# Patient Record
Sex: MALE | URBAN - METROPOLITAN AREA
[De-identification: names, ages, dates, MRNs, and addresses within clinical notes are randomized per-mention and may not be internally consistent; named-entity substitution may affect disease eponyms.]

---

## 2024-11-19 ENCOUNTER — APPOINTMENT (RX ONLY)
Dept: URBAN - METROPOLITAN AREA CLINIC 2 | Facility: CLINIC | Age: 74
Setting detail: DERMATOLOGY
End: 2024-11-19

## 2024-11-19 DIAGNOSIS — D22 MELANOCYTIC NEVI: ICD-10-CM

## 2024-11-19 DIAGNOSIS — D18.0 HEMANGIOMA: ICD-10-CM

## 2024-11-19 DIAGNOSIS — L81.5 LEUKODERMA, NOT ELSEWHERE CLASSIFIED: ICD-10-CM

## 2024-11-19 DIAGNOSIS — L82.1 OTHER SEBORRHEIC KERATOSIS: ICD-10-CM

## 2024-11-19 DIAGNOSIS — L81.4 OTHER MELANIN HYPERPIGMENTATION: ICD-10-CM

## 2024-11-19 DIAGNOSIS — Q82.5 CONGENITAL NON-NEOPLASTIC NEVUS: ICD-10-CM

## 2024-11-19 DIAGNOSIS — L259 CONTACT DERMATITIS AND OTHER ECZEMA, UNSPECIFIED CAUSE: ICD-10-CM

## 2024-11-19 DIAGNOSIS — Z71.89 OTHER SPECIFIED COUNSELING: ICD-10-CM

## 2024-11-19 DIAGNOSIS — B00.1 HERPESVIRAL VESICULAR DERMATITIS: ICD-10-CM

## 2024-11-19 DIAGNOSIS — L57.8 OTHER SKIN CHANGES DUE TO CHRONIC EXPOSURE TO NONIONIZING RADIATION: ICD-10-CM

## 2024-11-19 PROBLEM — D22.5 MELANOCYTIC NEVI OF TRUNK: Status: ACTIVE | Noted: 2024-11-19

## 2024-11-19 PROBLEM — D18.01 HEMANGIOMA OF SKIN AND SUBCUTANEOUS TISSUE: Status: ACTIVE | Noted: 2024-11-19

## 2024-11-19 PROBLEM — L23.9 ALLERGIC CONTACT DERMATITIS, UNSPECIFIED CAUSE: Status: ACTIVE | Noted: 2024-11-19

## 2024-11-19 PROCEDURE — ? PHOTO-DOCUMENTATION

## 2024-11-19 PROCEDURE — ? PRESCRIPTION

## 2024-11-19 PROCEDURE — ? OTHER

## 2024-11-19 PROCEDURE — 99203 OFFICE O/P NEW LOW 30 MIN: CPT

## 2024-11-19 PROCEDURE — ? COUNSELING

## 2024-11-19 RX ORDER — VALACYCLOVIR HYDROCHLORIDE 1 G/1
2 TABLET, FILM COATED ORAL Q12 HOURS
Qty: 8 | Refills: 2 | Status: CANCELLED
Stop reason: SDUPTHER

## 2024-11-19 RX ORDER — VALACYCLOVIR HYDROCHLORIDE 1 G/1
2 TABLET, FILM COATED ORAL Q12 HOURS
Qty: 8 | Refills: 2 | Status: ERX | COMMUNITY
Start: 2024-11-19

## 2024-11-19 RX ORDER — TRIAMCINOLONE ACETONIDE 1 MG/G
1 CREAM TOPICAL BID
Qty: 80 | Refills: 3 | Status: ERX | COMMUNITY
Start: 2024-11-19

## 2024-11-19 RX ADMIN — TRIAMCINOLONE ACETONIDE 1: 1 CREAM TOPICAL at 00:00

## 2024-11-19 RX ADMIN — VALACYCLOVIR HYDROCHLORIDE 2: 1 TABLET, FILM COATED ORAL at 00:00

## 2024-11-19 ASSESSMENT — LOCATION DETAILED DESCRIPTION DERM
LOCATION DETAILED: RIGHT CENTRAL ZYGOMA
LOCATION DETAILED: LEFT INFERIOR VERMILION LIP
LOCATION DETAILED: INFERIOR THORACIC SPINE
LOCATION DETAILED: LEFT MEDIAL POSTERIOR ANKLE
LOCATION DETAILED: LEFT AXILLARY VAULT
LOCATION DETAILED: SUPERIOR THORACIC SPINE
LOCATION DETAILED: RIGHT INFERIOR MEDIAL UPPER BACK
LOCATION DETAILED: STERNUM
LOCATION DETAILED: LEFT PROXIMAL DORSAL FOREARM
LOCATION DETAILED: EPIGASTRIC SKIN
LOCATION DETAILED: RIGHT AXILLARY VAULT
LOCATION DETAILED: LEFT SUPERIOR LATERAL MALAR CHEEK
LOCATION DETAILED: RIGHT MEDIAL TRAPEZIAL NECK
LOCATION DETAILED: RIGHT PROXIMAL DORSAL FOREARM

## 2024-11-19 ASSESSMENT — LOCATION SIMPLE DESCRIPTION DERM
LOCATION SIMPLE: RIGHT FOREARM
LOCATION SIMPLE: CHEST
LOCATION SIMPLE: LEFT POSTERIOR ANKLE
LOCATION SIMPLE: LEFT LIP
LOCATION SIMPLE: RIGHT ZYGOMA
LOCATION SIMPLE: UPPER BACK
LOCATION SIMPLE: RIGHT AXILLARY VAULT
LOCATION SIMPLE: LEFT FOREARM
LOCATION SIMPLE: POSTERIOR NECK
LOCATION SIMPLE: RIGHT UPPER BACK
LOCATION SIMPLE: ABDOMEN
LOCATION SIMPLE: LEFT AXILLARY VAULT
LOCATION SIMPLE: LEFT CHEEK

## 2024-11-19 ASSESSMENT — LOCATION ZONE DERM
LOCATION ZONE: TRUNK
LOCATION ZONE: FACE
LOCATION ZONE: AXILLAE
LOCATION ZONE: LIP
LOCATION ZONE: ARM
LOCATION ZONE: NECK
LOCATION ZONE: LEG

## 2024-11-19 NOTE — PROCEDURE: OTHER
Render Risk Assessment In Note?: no
Note Text (......Xxx Chief Complaint.): This diagnosis correlates with the
Other (Free Text): Happens once a year in late summer.
Detail Level: Detailed

## 2024-11-19 NOTE — PROCEDURE: COUNSELING
Patient Specific Counseling (Will Not Stick From Patient To Patient): Will refer for patch testing at [facility]
Detail Level: Zone
Sunscreen Recommendations: Discussed improvement of continual sun protection with zinc based sunscreen and photo protective clothing.\\nRecommended daily 30-50 SPF
Bleaching Agents Recommendations: Discussed that bleaching creams may be helpful but discoloration will recur with sun exposure
Laser Recommendations: Discussed laser treatments can be helpful to lighten. Treatment is considered cosmetic and not covered by insurance
Detail Level: Generalized
Sunscreen Recommendations: Discussed continual use of photo protection with sun screen and photo protective clothing
Sunscreen Recommendations: Zinc & titanium based daily sunscreen minimum of 30SPF, photo protective clothing
Detail Level: Simple
Sunscreen Recommendations: Broad spectrum zinc oxide 30-50 SPF applied through the day
Skin Checks Recommendations: Photo protective clothing, broad spectrum zinc oxide 50 SPF

## 2024-11-19 NOTE — HPI: SKIN LESIONS
Is This A New Presentation, Or A Follow-Up?: Growths
How Severe Is Your Skin Lesion?: moderate
Have Your Skin Lesions Been Treated?: not been treated
Additional History: Baseline first FBSE.

## 2024-11-20 RX ORDER — TRIAMCINOLONE ACETONIDE 1 MG/G
1 CREAM TOPICAL BID
Qty: 80 | Refills: 3 | Status: ERX

## 2024-11-20 RX ORDER — VALACYCLOVIR HYDROCHLORIDE 1 G/1
2 TABLET, FILM COATED ORAL Q12 HOURS
Qty: 8 | Refills: 2 | Status: ERX

## 2024-12-10 ENCOUNTER — APPOINTMENT (OUTPATIENT)
Age: 74
Setting detail: DERMATOLOGY
End: 2024-12-10

## 2024-12-10 DIAGNOSIS — L259 CONTACT DERMATITIS AND OTHER ECZEMA, UNSPECIFIED CAUSE: ICD-10-CM

## 2024-12-10 PROBLEM — L23.9 ALLERGIC CONTACT DERMATITIS, UNSPECIFIED CAUSE: Status: ACTIVE | Noted: 2024-12-10

## 2024-12-10 PROCEDURE — ? PHOTO-DOCUMENTATION

## 2024-12-10 PROCEDURE — ? PATCH TESTING

## 2024-12-10 PROCEDURE — 95044 PATCH/APPLICATION TESTS: CPT

## 2024-12-10 PROCEDURE — ? ORDER FOR PATCH TESTING

## 2024-12-10 NOTE — PROCEDURE: PATCH TESTING
Detail Level: None
Consent: Written consent obtained, risks reviewed including but not limited to rash, itching, allergic reaction, systemic rash, remote possiblity of anaphylaxis to allergen.
Number Of Patches Placed (May Be More Than What Is Billed, Based On Payer Rules) - Do Not Duplicate If Number Placed Is Number Billed: 88
Post-Care Instructions: I reviewed with the patient in detail post-care instructions. Patient should not sweat, pick at, or get the patches wet for 48 hours.

## 2024-12-10 NOTE — PROCEDURE: ORDER FOR PATCH TESTING
Detail Level: Simple
Location Patches Should Be Applied: Back
Patch Test Reading Schedule Override: First reading after 48 hours and second reading on day 7
Patch Test To Be Applied: Core ACDS Recommended Series
Counseling: I discussed the timing of the procedure and ensured the patient understands that this test requires multiple visits. While the patches are in place they should be kept dry which will limit bathing, swimming an exercise. I also explained that it is common for testing to be negative and this doesn't mean there isn't a allergic reaction occurring. During the testing itching is common.
Patch Test Reading Schedule: Override

## 2024-12-12 ENCOUNTER — APPOINTMENT (OUTPATIENT)
Age: 74
Setting detail: DERMATOLOGY
End: 2024-12-12

## 2024-12-12 DIAGNOSIS — L259 CONTACT DERMATITIS AND OTHER ECZEMA, UNSPECIFIED CAUSE: ICD-10-CM

## 2024-12-12 PROBLEM — L23.9 ALLERGIC CONTACT DERMATITIS, UNSPECIFIED CAUSE: Status: ACTIVE | Noted: 2024-12-12

## 2024-12-12 PROCEDURE — 99213 OFFICE O/P EST LOW 20 MIN: CPT

## 2024-12-12 PROCEDURE — ? TREATMENT REGIMEN

## 2024-12-12 PROCEDURE — ? COUNSELING

## 2024-12-12 PROCEDURE — ? CORE ACDS PATCH TEST READING

## 2024-12-12 PROCEDURE — ? PHOTO-DOCUMENTATION

## 2024-12-12 ASSESSMENT — PAIN INTENSITY VAS: HOW INTENSE IS YOUR PAIN 0 BEING NO PAIN, 10 BEING THE MOST SEVERE PAIN POSSIBLE?: NO PAIN

## 2024-12-12 ASSESSMENT — ITCH NUMERIC RATING SCALE: HOW SEVERE IS YOUR ITCHING?: 0

## 2024-12-12 NOTE — PROCEDURE: TREATMENT REGIMEN
Detail Level: Detailed
Plan: Skin marker provided to patient with instructions to remark lines that may have faded.

## 2024-12-12 NOTE — PROCEDURE: CORE ACDS PATCH TEST READING
Name Of Allergen 38: 38. iodopropynyl betaine
Name Of Allergen 70: 70. ethyl hexyl glycerol
Name Of Allergen 75: 75. phenoxyethanol
Name Of Allergen 34: 34. fragrance mix 2
Allergen 30 Reaction: no reaction
Name Of Allergen 72: 72. clobetasol-17-propionate
Name Of Allergen 48: 48. cinnamic aldehyde
Show Allergen Counseling In The Note?: No
Name Of Allergen 7: 7. gabrielony
Name Of Allergen 83: 83. jasminum
Name Of Allergen 71: 71. triamcinalone acetonide
Name Of Allergen 49: 49. dl alpha tocopherol (vitamin E)
Name Of Allergen 19: 19. lyral
Name Of Allergen 5: 5. DMDM hydantoin (formaldehyde
Name Of Allergen 31: 31. hydrocortisone-17-butyrate (corticosteroids)
Name Of Allergen 80: 80. benzyl alcohol
Name Of Allergen 51: 51. tea tree oil oxidized
Name Of Allergen 52: 52. chlorhexidine diclugonate
Name Of Allergen 66: 66. dimethylol dihydroxyethyleneurea
Name Of Allergen 69: 69. cetylstearyalcohol
Name Of Allergen 56: 56. tosylamide formaldehyde resin
Allergen 19 Reaction: 3+
Name Of Allergen 40: 40. cocamidopropyl betaine
Name Of Allergen 1: 1. Nickel sulfate Hexahydrate
Name Of Allergen 6: 6. Fragrance mix 1
Name Of Allergen 22: 22. mercapto mix A (rubber accelerators)
Name Of Allergen 3: 3. neomycin sulfate
Name Of Allergen 14: 14. bisphenol A epoxy resin
Name Of Allergen 23: 23. 2-bromo-2-nitropropane-1,3-diol (formaldehyde)
Name Of Allergen 39: 39. polymyxin B sulfate
Name Of Allergen 59: 59. pramoxine hydrochloride 2%
Name Of Allergen 43: 43. 2-hydroxyethyl-methacrylate
Name Of Allergen 76: 76. disperse orange-3
Name Of Allergen 16: 16 black rubber mix
Name Of Allergen 88: 88. malia
Allergen 53 Reaction: irritant reaction
Name Of Allergen 41: 41. mixed dialkyl thioureas
Name Of Allergen 53: 53. propolis
Name Of Allergen 86: 86. lauryl glycoside
Name Of Allergen 78: 78. butylhydroxytoluene
Name Of Allergen 2: 2. Amerchol L101
What Reading Time Point?: 48 hour
Name Of Allergen 36: 36. lidocaine-hci
Name Of Allergen 82: 82. disperse yellow
Name Of Allergen 32: 32. 2-mercaptobenzothiazole
Name Of Allergen 13: 13. petrolatum-tert- butylphenol formaldehyde resin
Name Of Allergen 60: 60. benzalkonium chloride
Name Of Allergen 46: 46. methyl methacrylate
Name Of Allergen 77: 77. benzoic acid
Name Of Allergen 21: 21. formaldehyde
Name Of Allergen 8: 8. paraben Mix B,
Name Of Allergen 64: 64. cananga odorata
Name Of Allergen 57: 57. sesquiterpenelactone mix
Name Of Allergen 74: 74. ethyl cyanoacrylate
Allergen 34 Reaction: 2+
Name Of Allergen 9: 9. methylisothiazolinone
Name Of Allergen 29: 29. imidazolidinyl urea (formaldehyde)
Name Of Allergen 84: 84. peppermint oil
Name Of Allergen 73: 73. amidoamine
Name Of Allergen 20: 20. petrolatum-phenylenediamine
Name Of Allergen 30: 30. budesonide( corticosteroids)
Name Of Allergen 55: 55. 2-hydroxy-4-methoxybenzophenone
Number Of Patches Read: 88
Name Of Allergen 35: 35. disperse blue mix 124/106
Name Of Allergen 15: 15. carba mix (rubber accelerators)
Name Of Allergen 33: 33. bacitracin
Name Of Allergen 63: 63. sorbic acid
Name Of Allergen 17: 17. methylchlorothiazolinone/methylisothiazolinone
Name Of Allergen 25: 25. diazolidinyl urea (formaldehyde)
Name Of Allergen 87: 87. 4-chloro-3-cresol
Name Of Allergen 12: 12. cobalt 2 chloride hexahydrate
Name Of Allergen 18: 18. quaternium 15 (formaldehyde)
Name Of Allergen 47: 47. lavandula angustifolia oil
Name Of Allergen 27: 27. tixocortol-21-pivalate (corticosteroids)
Name Of Allergen 65: 65. compositae mix
Name Of Allergen 85: 85. shellac
Name Of Allergen 54: 54. 4-chloro-3,5-xylenol
Name Of Allergen 28: 28. gold sodium thiosulfate
Name Of Allergen 58: 58. coconut diethanolamide
Name Of Allergen 37: 37. propylene glycol
Name Of Allergen 50: 50. ethyl acrylate
Name Of Allergen 45: 45. decyl glucoside
Name Of Allergen 26: 26. benzocaine
Name Of Allergen 24: 24. thiuram mix A (rubber accelerators)
Name Of Allergen 62: 62. sodium benzoate
Name Of Allergen 11: 11. ethylenediamine dihydrochloride
Show Negative Results In The Note?: Yes
Name Of Allergen 44: 44. oleamidopropyl dimethylamine
Name [facility]en 10[facility]
Name Of Allergen 68: 68. 1,3-diphenylguanidine
Name Of Allergen 4: 4. potassium dichromate
Name Of Allergen 42: 42. dimethylaminopropylamine
Detail Level: Zone
Name Of Allergen 67: 67. sorbitan sesquioleate
Name Of Allergen 81: 81. benzyl salicylate
Name Of Allergen 79: 79. 2-ethylhexyl-4-methoxycinnamate
Name Of Allergen 61: 61. benzophenone chloride

## 2024-12-17 ENCOUNTER — APPOINTMENT (OUTPATIENT)
Age: 74
Setting detail: DERMATOLOGY
End: 2024-12-17

## 2024-12-17 DIAGNOSIS — L259 CONTACT DERMATITIS AND OTHER ECZEMA, UNSPECIFIED CAUSE: ICD-10-CM

## 2024-12-17 PROBLEM — L23.9 ALLERGIC CONTACT DERMATITIS, UNSPECIFIED CAUSE: Status: ACTIVE | Noted: 2024-12-17

## 2024-12-17 PROCEDURE — ? CORE ACDS PATCH TEST READING

## 2024-12-17 PROCEDURE — ? TREATMENT REGIMEN

## 2024-12-17 PROCEDURE — 99213 OFFICE O/P EST LOW 20 MIN: CPT

## 2024-12-17 PROCEDURE — ? COUNSELING

## 2024-12-17 ASSESSMENT — PAIN INTENSITY VAS: HOW INTENSE IS YOUR PAIN 0 BEING NO PAIN, 10 BEING THE MOST SEVERE PAIN POSSIBLE?: NO PAIN

## 2024-12-17 ASSESSMENT — ITCH NUMERIC RATING SCALE: HOW SEVERE IS YOUR ITCHING?: 0

## 2024-12-17 NOTE — PROCEDURE: CORE ACDS PATCH TEST READING
Allergen 9 Reaction: no reaction
Name Of Allergen 53: 53. propolis
Name Of Allergen 58: 58. coconut diethanolamide
Name Of Allergen 16: 16. black rubber mix
Name Of Allergen 74: 74. ethyl cyanoacrylate
Name Of Allergen 45: 45. decyl glucoside
Name Of Allergen 48: 48. cinnamic aldehyde
Detail Level: Zone
Name Of Allergen 87: 87. 4-chloro-3-cresol
Name Of Allergen 21: 21. formaldehyde
Name Of Allergen 59: 59. pramoxine hydrochloride 2%
Name Of Allergen 25: 25. diazolidinyl urea (formaldehyde)
Name Of Allergen 43: 43. 2-hydroxyethyl-methacrylate
Name Of Allergen 54: 54. 4-chloro-3,5-xylenol
Name Of Allergen 2: 2. Amerchol L101
Name Of Allergen 18: 18. quaternium 15 (formaldehyde)
Number Of Patches Read: 88
Name Of Allergen 27: 27. tixocortol-21-pivalate (corticosteroids)
Name Of Allergen 70: 70. ethyl hexyl glycerol
Name Of Allergen 42: 42. dimethylaminopropylamine
Name Of Allergen 50: 50. ethyl acrylate
Name Of Allergen 78: 78. butylhydroxytoluene
Name Of Allergen 46: 46. methyl methacrylate
Name Of Allergen 61: 61. benzophenone chloride
What Reading Time Point?: 168 hour
Name Of Allergen 40: 40. cocamidopropyl betaine
Name Of Allergen 55: 55. 2-hydroxy-4-methoxybenzophenone
Name Of Allergen 51: 51. tea tree oil oxidized
Name Of Allergen 28: 28. gold sodium thiosulfate
Name Of Allergen 9: 9. methylisothiazolinone
Name Of Allergen 20: 20. petrolatum-phenylenediamine
Name Of Allergen 44: 44. oleamidopropyl dimethylamine
Name Of Allergen 60: 60. benzalkonium chloride
Name Of Allergen 37: 37. propylene glycol
Name Of Allergen 76: 76. disperse orange-3
Name Of Allergen 71: 71. triamcinalone acetonide
Allergen 19 Reaction: 3+
Name Of Allergen 7: 7. gabrielony
Show Negative Results In The Note?: Yes
Name Of Allergen 77: 77. benzoic acid
Name Of Allergen 19: 19. lyral
Name Of Allergen 32: 32. 2-mercaptobenzothiazole
Show Allergen Counseling In The Note?: No
Name Of Allergen 14: 14. bisphenol A epoxy resin
Name Of Allergen 47: 47. lavandula angustifolia oil
Name Of Allergen 6: 6. Fragrance mix 1
Name Of Allergen 34: 34. fragrance mix 2
Name Of Allergen 22: 22. mercapto mix A (rubber accelerators)
Name Of Allergen 23: 23. 2-bromo-2-nitropropane-1,3-diol (formaldehyde)
Name Of Allergen 4: 4. potassium dichromate
Name Of Allergen 75: 75. phenoxyethanol
Name Of Allergen 83: 83. jasminum
Name Of Allergen 68: 68. 1,3-diphenylguanidine
Name Of Allergen 13: 13. petrolatum-tert- butylphenol formaldehyde resin
Name Of Allergen 52: 52. chlorhexidine diclugonate
Name Of Allergen 41: 41. mixed dialkyl thioureas
Name Of Allergen 66: 66. dimethylol dihydroxyethyleneurea
Name Of Allergen 5: 5. DMDM hydantoin (formaldehyde
Name Of Allergen 80: 80. benzyl alcohol
Name Of Allergen 12: 12. cobalt 2 chloride hexahydrate
Name Of Allergen 81: 81. benzyl salicylate
Name [facility] Allergen 10: [facility]. [facility]
Name Of Allergen 85: 85. shellac
Name Of Allergen 72: 72. clobetasol-17-propionate
Name Of Allergen 65: 65. compositae mix
Name Of Allergen 56: 56. tosylamide formaldehyde resin
Name Of Allergen 1: 1. Nickel sulfate Hexahydrate
Name Of Allergen 17: 17. methylchlorothiazolinone/methylisothiazolinone
Name Of Allergen 29: 29. imidazolidinyl urea (formaldehyde)
Name Of Allergen 31: 31. hydrocortisone-17-butyrate (corticosteroids)
Name Of Allergen 39: 39. polymyxin B sulfate
Name Of Allergen 73: 73. amidoamine
Name Of Allergen 62: 62. sodium benzoate
Name Of Allergen 15: 15. carba mix (rubber accelerators)
Name Of Allergen 26: 26. benzocaine
Name Of Allergen 57: 57. sesquiterpenelactone mix
Name Of Allergen 82: 82. disperse yellow
Name Of Allergen 64: 64. cananga odorata
Name Of Allergen 49: 49. dl alpha tocopherol (vitamin E)
Name Of Allergen 3: 3. neomycin sulfate
Name Of Allergen 69: 69. cetylstearyalcohol
Name Of Allergen 30: 30. budesonide( corticosteroids)
Name Of Allergen 11: 11. ethylenediamine dihydrochloride
Name Of Allergen 8: 8. paraben Mix B,
Name Of Allergen 33: 33. bacitracin
Name Of Allergen 79: 79. 2-ethylhexyl-4-methoxycinnamate
Name Of Allergen 35: 35. disperse blue mix 124/106
Name Of Allergen 38: 38. iodopropynyl betaine
Name Of Allergen 86: 86. lauryl glycoside
Name Of Allergen 36: 36. lidocaine-hci
Name Of Allergen 88: 88. malia
Name Of Allergen 67: 67. sorbitan sesquioleate
Name Of Allergen 84: 84. peppermint oil
Name Of Allergen 24: 24. thiuram mix A (rubber accelerators)
Name Of Allergen 63: 63. sorbic acid